# Patient Record
Sex: FEMALE | URBAN - METROPOLITAN AREA
[De-identification: names, ages, dates, MRNs, and addresses within clinical notes are randomized per-mention and may not be internally consistent; named-entity substitution may affect disease eponyms.]

---

## 2017-06-28 ENCOUNTER — HISTORICAL (OUTPATIENT)
Dept: ADMINISTRATIVE | Facility: HOSPITAL | Age: 47
End: 2017-06-28

## 2017-06-28 LAB
CANCER AG125 SERPL-ACNC: 10.7 IU/ML (ref 1.5–35)
CEA SERPL-MCNC: 2.9 NG/ML
LDH SERPL-CCNC: 228 UNIT/L (ref 84–246)

## 2017-07-17 ENCOUNTER — HISTORICAL (OUTPATIENT)
Dept: ADMINISTRATIVE | Facility: HOSPITAL | Age: 47
End: 2017-07-17

## 2017-07-26 ENCOUNTER — HISTORICAL (OUTPATIENT)
Dept: ADMINISTRATIVE | Facility: HOSPITAL | Age: 47
End: 2017-07-26

## 2017-07-26 LAB
ALBUMIN SERPL-MCNC: 4.3 GM/DL (ref 3.4–5)
ALBUMIN/GLOB SERPL: 1 RATIO (ref 1–2)
ALP SERPL-CCNC: 46 UNIT/L (ref 45–117)
ALT SERPL-CCNC: 16 UNIT/L (ref 12–78)
AST SERPL-CCNC: 12 UNIT/L (ref 15–37)
BILIRUB SERPL-MCNC: 0.5 MG/DL (ref 0.2–1)
BILIRUBIN DIRECT+TOT PNL SERPL-MCNC: <0.1 MG/DL
BILIRUBIN DIRECT+TOT PNL SERPL-MCNC: >0.4 MG/DL
BUN SERPL-MCNC: 14 MG/DL (ref 7–18)
CALCIUM SERPL-MCNC: 8.9 MG/DL (ref 8.5–10.1)
CHLORIDE SERPL-SCNC: 108 MMOL/L (ref 98–107)
CO2 SERPL-SCNC: 27 MMOL/L (ref 21–32)
CREAT SERPL-MCNC: 0.9 MG/DL (ref 0.6–1.3)
GLOBULIN SER-MCNC: 3.1 GM/ML (ref 2.3–3.5)
GLUCOSE SERPL-MCNC: 85 MG/DL (ref 74–106)
HAV IGM SERPL QL IA: NONREACTIVE
HBV CORE IGM SERPL QL IA: NONREACTIVE
HBV SURFACE AG SERPL QL IA: NEGATIVE
HCV AB SERPL QL IA: NONREACTIVE
HIV 1+2 AB+HIV1 P24 AG SERPL QL IA: NONREACTIVE
POC BETA-HCG (QUAL): NEGATIVE
POTASSIUM SERPL-SCNC: 4.2 MMOL/L (ref 3.5–5.1)
PROT SERPL-MCNC: 7.4 GM/DL (ref 6.4–8.2)
SODIUM SERPL-SCNC: 142 MMOL/L (ref 136–145)

## 2017-08-03 ENCOUNTER — HISTORICAL (OUTPATIENT)
Dept: ADMINISTRATIVE | Facility: HOSPITAL | Age: 47
End: 2017-08-03

## 2017-08-03 LAB
ERYTHROCYTE [DISTWIDTH] IN BLOOD BY AUTOMATED COUNT: 12.2 % (ref 11.5–14.5)
HCT VFR BLD AUTO: 36.7 % (ref 35–46)
HGB BLD-MCNC: 12.1 GM/DL (ref 12–16)
MCH RBC QN AUTO: 29.9 PG (ref 26–34)
MCHC RBC AUTO-ENTMCNC: 33 GM/DL (ref 31–37)
MCV RBC AUTO: 90.6 FL (ref 80–100)
PLATELET # BLD AUTO: 195 X10(3)/MCL (ref 130–400)
PMV BLD AUTO: 10.7 FL (ref 7.4–10.4)
RBC # BLD AUTO: 4.05 X10(6)/MCL (ref 4–5.2)
T PALLIDUM AB SER QL: NONREACTIVE
WBC # SPEC AUTO: 5.9 X10(3)/MCL (ref 4.5–11)

## 2017-08-10 ENCOUNTER — HOSPITAL ENCOUNTER (OUTPATIENT)
Dept: MEDSURG UNIT | Facility: HOSPITAL | Age: 47
End: 2017-08-11
Attending: OBSTETRICS & GYNECOLOGY | Admitting: OBSTETRICS & GYNECOLOGY

## 2017-08-10 LAB — B-HCG SERPL QL: NEGATIVE

## 2017-08-11 LAB
ABS NEUT (OLG): 7.16 X10(3)/MCL (ref 2.1–9.2)
ALBUMIN SERPL-MCNC: 3 GM/DL (ref 3.4–5)
ALBUMIN/GLOB SERPL: 1 RATIO (ref 1–2)
ALP SERPL-CCNC: 39 UNIT/L (ref 45–117)
ALT SERPL-CCNC: 15 UNIT/L (ref 12–78)
AST SERPL-CCNC: 26 UNIT/L (ref 15–37)
BASOPHILS # BLD AUTO: 0.03 X10(3)/MCL
BASOPHILS NFR BLD AUTO: 0 % (ref 0–1)
BILIRUB SERPL-MCNC: 1 MG/DL (ref 0.2–1)
BILIRUBIN DIRECT+TOT PNL SERPL-MCNC: 0.2 MG/DL
BILIRUBIN DIRECT+TOT PNL SERPL-MCNC: 0.8 MG/DL
BUN SERPL-MCNC: 10 MG/DL (ref 7–18)
CALCIUM SERPL-MCNC: 8.1 MG/DL (ref 8.5–10.1)
CHLORIDE SERPL-SCNC: 109 MMOL/L (ref 98–107)
CO2 SERPL-SCNC: 24 MMOL/L (ref 21–32)
CREAT SERPL-MCNC: 0.8 MG/DL (ref 0.6–1.3)
EOSINOPHIL # BLD AUTO: 0.06 10*3/UL
EOSINOPHIL NFR BLD AUTO: 1 % (ref 0–5)
ERYTHROCYTE [DISTWIDTH] IN BLOOD BY AUTOMATED COUNT: 12.5 % (ref 11.5–14.5)
GLOBULIN SER-MCNC: 2.4 GM/ML (ref 2.3–3.5)
GLUCOSE SERPL-MCNC: 79 MG/DL (ref 74–106)
HCT VFR BLD AUTO: 32.2 % (ref 35–46)
HGB BLD-MCNC: 10.9 GM/DL (ref 12–16)
IMM GRANULOCYTES # BLD AUTO: 0.02 10*3/UL
IMM GRANULOCYTES NFR BLD AUTO: 0 %
LYMPHOCYTES # BLD AUTO: 1.11 X10(3)/MCL
LYMPHOCYTES NFR BLD AUTO: 13 % (ref 15–40)
MCH RBC QN AUTO: 31.3 PG (ref 26–34)
MCHC RBC AUTO-ENTMCNC: 33.9 GM/DL (ref 31–37)
MCV RBC AUTO: 92.5 FL (ref 80–100)
MONOCYTES # BLD AUTO: 0.44 X10(3)/MCL
MONOCYTES NFR BLD AUTO: 5 % (ref 4–12)
NEUTROPHILS # BLD AUTO: 7.16 X10(3)/MCL
NEUTROPHILS NFR BLD AUTO: 81 X10(3)/MCL
PLATELET # BLD AUTO: 156 X10(3)/MCL (ref 130–400)
PMV BLD AUTO: 11 FL (ref 7.4–10.4)
POTASSIUM SERPL-SCNC: 4.4 MMOL/L (ref 3.5–5.1)
PROT SERPL-MCNC: 5.4 GM/DL (ref 6.4–8.2)
RBC # BLD AUTO: 3.48 X10(6)/MCL (ref 4–5.2)
SODIUM SERPL-SCNC: 140 MMOL/L (ref 136–145)
WBC # SPEC AUTO: 8.8 X10(3)/MCL (ref 4.5–11)

## 2017-08-16 ENCOUNTER — HISTORICAL (OUTPATIENT)
Dept: WOUND CARE | Facility: HOSPITAL | Age: 47
End: 2017-08-16

## 2017-08-16 LAB
ABS NEUT (OLG): 6.15 X10(3)/MCL (ref 2.1–9.2)
APPEARANCE, UA: CLEAR
BACTERIA #/AREA URNS AUTO: ABNORMAL /[HPF]
BASOPHILS # BLD AUTO: 0.02 X10(3)/MCL
BASOPHILS NFR BLD AUTO: 0 % (ref 0–1)
BILIRUB SERPL-MCNC: NEGATIVE MG/DL
BILIRUB UR QL STRIP: NEGATIVE
BLOOD URINE, POC: NORMAL
CLARITY, POC UA: NORMAL
COLOR UR: ABNORMAL
COLOR, POC UA: NORMAL
EOSINOPHIL # BLD AUTO: 0.12 10*3/UL
EOSINOPHIL NFR BLD AUTO: 2 % (ref 0–5)
ERYTHROCYTE [DISTWIDTH] IN BLOOD BY AUTOMATED COUNT: 12.5 % (ref 11.5–14.5)
GLUCOSE (UA): NORMAL
GLUCOSE UR QL STRIP: NEGATIVE
HCT VFR BLD AUTO: 34 % (ref 35–46)
HGB BLD-MCNC: 11.5 GM/DL (ref 12–16)
HGB UR QL STRIP: 0.2 MG/DL
HYALINE CASTS #/AREA URNS LPF: ABNORMAL /[LPF]
IMM GRANULOCYTES # BLD AUTO: 0.02 10*3/UL
IMM GRANULOCYTES NFR BLD AUTO: 0 %
KETONES UR QL STRIP: NEGATIVE
KETONES UR QL STRIP: NEGATIVE
LEUKOCYTE EST, POC UA: NORMAL
LEUKOCYTE ESTERASE UR QL STRIP: 75 LEU/UL
LYMPHOCYTES # BLD AUTO: 1.06 X10(3)/MCL
LYMPHOCYTES NFR BLD AUTO: 14 % (ref 15–40)
MCH RBC QN AUTO: 31 PG (ref 26–34)
MCHC RBC AUTO-ENTMCNC: 33.8 GM/DL (ref 31–37)
MCV RBC AUTO: 91.6 FL (ref 80–100)
MONOCYTES # BLD AUTO: 0.42 X10(3)/MCL
MONOCYTES NFR BLD AUTO: 5 % (ref 4–12)
NEUTROPHILS # BLD AUTO: 6.15 X10(3)/MCL
NEUTROPHILS NFR BLD AUTO: 79 X10(3)/MCL
NITRITE UR QL STRIP: NEGATIVE
NITRITE, POC UA: NEGATIVE
PH UR STRIP: 6.5 [PH] (ref 4.5–8)
PH, POC UA: 5
PLATELET # BLD AUTO: 255 X10(3)/MCL (ref 130–400)
PMV BLD AUTO: 9.8 FL (ref 7.4–10.4)
PROT UR QL STRIP: NEGATIVE
PROTEIN, POC: NEGATIVE
RBC # BLD AUTO: 3.71 X10(6)/MCL (ref 4–5.2)
RBC #/AREA URNS AUTO: ABNORMAL /[HPF]
SP GR UR STRIP: 1 (ref 1–1.03)
SPECIFIC GRAVITY, POC UA: 1
SQUAMOUS #/AREA URNS LPF: >100 /[LPF]
UROBILINOGEN UR STRIP-ACNC: NORMAL
UROBILINOGEN, POC UA: NORMAL
WBC # SPEC AUTO: 7.8 X10(3)/MCL (ref 4.5–11)
WBC #/AREA URNS AUTO: ABNORMAL /HPF

## 2017-08-18 LAB — FINAL CULTURE: NO GROWTH

## 2022-04-11 ENCOUNTER — HISTORICAL (OUTPATIENT)
Dept: ADMINISTRATIVE | Facility: HOSPITAL | Age: 52
End: 2022-04-11

## 2022-04-27 VITALS
WEIGHT: 97 LBS | DIASTOLIC BLOOD PRESSURE: 77 MMHG | OXYGEN SATURATION: 98 % | BODY MASS INDEX: 21.82 KG/M2 | SYSTOLIC BLOOD PRESSURE: 128 MMHG | HEIGHT: 56 IN

## 2022-05-04 NOTE — HISTORICAL [facility] CERNER
This is a historical note converted from [employer]. Formatting and pictures may have been removed.  Please reference Dustin for original formatting and attached multimedia. Admission Information  [facility] w/RAULITO Michelle, cystoscopy for symptomatic fibroids  Hospital Course  Patient presented for scheduled procedure as documented above. She underwent the procedure without complications. On POD#1, she was tolerating regular diet without N/V, ambulating without difficulty, voiding, passing flatus and had adequate pain control. Stable for discharge home on POD#1 with pain medications and outpatient follow-up.  Significant Findings  EUA: normal external genitalia, vulva without lesions. Normal appearing urethral meatus. Normal appearing cervix without lesions, deviated to the left. No discharge or blood in the vaginal vault. Bilateral fullness. 16-18 week size non mobile uterus, firm with minimal descent and adequate capacity  ?   Intraop: 16w size uterus with dominant anterior and posterior fibroids. Left fallopian tube adhered to cul de sac. Small omental adhesion beneath uterus and anterior wall. Following release of adhesions, normal bilateral?fallopian tubes and ovaries. Cystoscopy with bilateral efflux of urine from ureteral orifices.  Procedures and Treatment Provided  [facility] w/RAULITO Michelle, cystoscopy  Physical Exam  Vitals & Measurements  T:?37.3? ?C ?(Temporal Artery)? HR:?76?(Monitored)? BP:?101/66? SpO2:?99%?  Gen: A&O x 3, in NAD  Heart: bradycardic (50s), regular rhythm  Lungs: CTAB. On RA  Abd: soft, ATTP, nd, normoactive bs, incisions c/d/i  : conner in place and draining yellow urine (mildly concentrated in appearance), no blood noted on peripad  Extr: warm and well perfused, nt, non edematous, SCDs on and working  Discharge Plan  Home on POD#1 with motrin, percocet and outpatient follow-up on Aug 25.  Patient Discharge Condition  Stable; good  Discharge Disposition  Home with

## 2022-05-04 NOTE — HISTORICAL [facility] CERNER
This is a historical note converted from [employer]. Formatting and pictures may have been removed.  Please reference [employer]ner for original formatting and attached multimedia. Indication for Surgery  Rapid interval growth in uterine size with increasing pain  Preoperative Diagnosis  Symptomatic uterine fibroids, uteromegaly  Postoperative Diagnosis  Same  Operation  Total laparoscopic hysterectomy (>250g), Bilateral Salpingectomy, Cystoscopy. Mini Pfannensteil incision  Surgeon(s)  Staff: Shania Lawson MD  Primary: Lillie Dietz MD  Assistant  Brandi Tucker MD  Anesthesia  General endotracheal  Lidocaine 1% without epinephrine  Estimated Blood Loss  450mL  ?  IVF  1550mL  Urine Output  200mL  Findings  EUA: normal external genitalia, vulva without lesions. Normal appearing urethral meatus. Normal appearing cervix without lesions, deviated to the left. No discharge or blood in the vaginal vault. Bilateral fullness. 16-18 week size non mobile uterus, firm with minimal descent and adequate capacity  ?  Intraop: 16w size uterus with dominant anterior and posterior fibroids. Left fallopian tube adhered to cul de sac. Small omental adhesion beneath uterus and anterior wall. Following release of adhesions, normal bilateral?fallopian tubes and ovaries. Cystoscopy with bilateral efflux of urine from ureteral orifices.  Specimen(s)  Uterus (746g), cervix, right and left fallopian tube  Complications  None  Technique  Patient was taken to the operating room with IVF running. She was placed in the dorsal lithotomy position, arms tucked with care not to put pressure on the ulnar nerve. General anesthesia was obtained without difficulty. She received Ancef 2g for preoperative antibiotics and SCDs for DVT prophylaxis.?She was prepped and draped in the normal sterile fashion. A conner catheter was placed into the bladder.?A speculum was placed into the vagina. The anterior lip of the cervix was grasped with a single-toothed  tenaculum. The uterus was?sounded without difficulty? to assist with the placement of a ITZEL II uterine manipulator. The tenaculum and speculum were then removed without difficulty.  ?   1% Lidocaine plain was injected?in the mid left abdomen at least 5cm lateral to the umbilicus. A 5 mm Visiport trocar with the 0-degree laparoscope was inserted into the abdomen while the abdominal wall was tented upward. The obturator was removed and placement ascertained with the laparoscope. The abdomen and pelvis were insufflated with CO2 gas to a level of 15 mmHg. No visceral or vascular injury occurred with entry into abdomen. At that time, Trendelenberg position was obtained to keep the bowel out of the operative field. A survey of the abdomen and pelvis was performed with the aforementioned findings. In similar fashion?three?additional 5mm trocars were?inserted in the left and right lateral pelvis. A 15mm Trocar was?placed?2cm super to the umbilicus.  ?   The anatomy was distorted due to the uterine fibroids. Attention was paid to the right side of the uterus.?The right utero-ovarian ligaments was sequentially clamped, sealed and cut near the cornua. Next, the round ligament was sequentially clamped, sealed and cut without difficulty. The posterior leaf of the broad ligament was then carefully dissected. ?The posterior broad ligament was grasped, coagulation and transected to allow for better visualization. The anterior broad ligament remained intact. Posteriorly the uterine vessels were skeletonized. The decision was made to begin ligating the uterine arteries which were sequentially clamped, coagulated and cut. Anteriorly filmy adhesions were bluntly dissected to facilitate exposure of the pubocervical fascia. Attention was paid to the left side of the uterus. ?The left utero-ovarian ligaments was sequentially clamped, sealed and cut near the cornua. The round ligament was distinctly visualized. The anterior leaf of the  broad ligament was amassed with the dense anterior adhesions down to the cervix. The posterior leaf of the broad ligament was then carefully dissected. ?The posterior broad ligament was grasped, coagulation and transected to allow for better visualization. Posteriorly the uterine vessels were skeletonized. The decision was made to begin ligating the uterine arteries which were sequentially clamped, coagulated and cut. With careful dissection, the uterine vessels were skeletonized. The arteries were sequentially clamped, coagulated and cut.  ?  Attention was then paid to the right side were tissue near the bladder was dissected to allow for good visualization of the pubocervical fascia. The right uterine arteries which were further clamped and coagulated in a sequential fashion?using the bipolar energy device down to the KOH ring.??The uterus was entirely blanched. ?Since the blood supply on both sides was transected attention was then paid to the anterior dissection. ?The uterus was released from the anterior abdominal wall with scissors. Some of the adhesion were clamped, seated and transected. ?Careful attention was paid to no cause trauma or injury to the bladder. ?The remainder of the anterior leaf of the broad ligament was clamped, sealed and transected on the both the right and left. ?The remainder of the bladder flap was developed. ?The bladder was dissected off the cervix and KOH ring. The Harmonic Tissue sealer device was then used to develop a colpotomy. Starting on the right side the colpotomy was develop and hemostasis of minor bleeding was achieved with the bipolar device. The colpotomy was created without difficulty. The uterine manipulator was removed.  ?   At this time, the gas was turned off and a mini Pfannenstiel incision was performed two finger breadths above the pubic symphysis for removal of the specimen.?A 4cm?incision was made with the knife and carried down to the level of the fascia with  the Bovie.?The fascia was undermined with Lashawn clamps and the incision was extended out laterally on each side with the Bovie. The rectus muscle was dissected off?both?bluntly and with the?Bovie. The muscle was taken down in the midline inferiorly using the Bovie. Underlying peritoneum was elevated with two hemostats and entered sharply with the Metzenbaum scissors.?The peritoneum was then stretched in a lateral and upward fashion and further taken down in the midline with the Bovie. Bowel was packed away with two moist laparotomy sponges (tagged). An Terry retractor was then placed being careful not to entrap any bowel or omentum. The uterus was then grasped with perforating towel clamps and brought up to the level of the skin. Uterine morcellation was performed with a 10 blade to remove the uterus from the abdominal cavity. A bilateral salpingectomy was then performed with the Harmonic Tissue Sealer. The vaginal cuff angles were then closed with individual figure of eight stitches on a 0 Vicryl pop offs. Care was taken to incorporate the uterosacrals in the angle closure. Three additional figure of eight stitches were placed to close the cuff incorporating posterior and anterior peritoneum in to each vaginal bite. Copious irrigation was performed and hemostasis was noted. The Terry retractor and lap sponges were removed. At this time, the bowels were covered with a moist sponge within the Pfannenstiel and attention was turned to cystoscopy.  ?  The cystoscope which was introduced under direct visualization. The bladder dome was inspected and noted to be intact. Hunners ulcers were noted. There was efflux of urine from bilateral ureteral orifices. No evidence of trauma or suture material noted. The instruments were removed.?  ?  The laparoscopic trocar balloons were deflated under direct palpation and? removed without difficulty. Copious irrigation was once again performed in the pelvis. The fascial closure for  the 15mm incision was performed with a Vicryl on a UR-6 needle. The skin closure for all trocar sites was performed with 4-0 Vicryl interrupted sutures and all skin?closures were reinforced with Dermabond. The peritoneum within the Pfannenstiel?was reapproximated in a continuous running fashion with 2-0 Vicryl. The fascia was closed in a continuous running fashion left to right and right to left overlapping in the middle using 0 Vicryl. Irrigation was performed. The subcutaneous fat was closed in a continuous running fashion with 2-0 Vicryl. The subcuticular layer was closed with 4-0 Vicryl in a continuous running fashion.  ?  All the instrument and sponge counts were correct x 2. The patient was awakened from general anesthesia without difficulty and transferred to the recovery room. Dr. Lawson was scrubbed and assisted during the procedure.  ?   This certifies I was present during the entire period between opening and closing of the surgical field.? Due to level of difficulty, I personally assisted in this case.? Blood loss was during manipulation of uterus, uterine serosa was taught? and enlarged due to fibroids, and easily torn while using the single toothed?tenaculum.??Several incisions were bleeding?therefore a deep suture of 4-0 Vicryl placed once?compressive trocars?removed.? Midline vertical incision was also bleeding and a small vessel was coagulated with the monolar bovie and compression with hemostat.

## 2022-09-22 ENCOUNTER — HISTORICAL (OUTPATIENT)
Dept: ADMINISTRATIVE | Facility: HOSPITAL | Age: 52
End: 2022-09-22